# Patient Record
Sex: MALE | Race: OTHER | HISPANIC OR LATINO | Employment: PART TIME | URBAN - METROPOLITAN AREA
[De-identification: names, ages, dates, MRNs, and addresses within clinical notes are randomized per-mention and may not be internally consistent; named-entity substitution may affect disease eponyms.]

---

## 2017-01-04 ENCOUNTER — ALLSCRIPTS OFFICE VISIT (OUTPATIENT)
Dept: OTHER | Facility: OTHER | Age: 32
End: 2017-01-04

## 2017-10-18 ENCOUNTER — GENERIC CONVERSION - ENCOUNTER (OUTPATIENT)
Dept: OTHER | Facility: OTHER | Age: 32
End: 2017-10-18

## 2017-11-22 ENCOUNTER — GENERIC CONVERSION - ENCOUNTER (OUTPATIENT)
Dept: OTHER | Facility: OTHER | Age: 32
End: 2017-11-22

## 2018-01-09 NOTE — CONSULTS
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)    Plan  Abnormal CT of brain    · Follow-up visit in 6 months Evaluation and Treatment  Follow-up, after CT head, Rhoda Nix 12/27/16 @ 930  Status: Complete  Done: [id number]   Ordered; For: Abnormal CT of brain; Ordered By: Yumi Joshua Performed:  Due: 96YWX8146; Last Updated By: Kimberly Galicia; 6/30/2016 8:40:13 AM   · * CT HEAD WO CONTRAST; Status:Need Information - Financial Authorization; Requested for:Approx [id number];    Perform:[facility] Radiology; Last Updated By:Heather Cleveland; 6/30/2016 8:42:14 AM;Ordered; For:Abnormal CT of brain; Ordered By:Jourdan Duong;    Discussion/Summary    76-year-old male, presents for review of CT head  Studies reviewed in detail by Dr Washington Talamantes, and reviewed with the patient  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  No jhonny lucencies are identified in the left occiput  Clinically patient was and asymptomatic of these findings, he does continue some upper and lower back spasm which he reports is improved and under management  Further follow-up in 6 months is advised with imaging (CT head) prior to visit to assess clival status  These findings, impressions and recommendations are reviewed in great detail with the patient, he expressed understanding and agreement, his questions were answered completely and to his satisfaction  Follow up has been scheduled in 6 months, and imaging has been requested prior to visit  Chief Complaint    1  Headache  Initial consultation, history of abnormal cervical spine x-ray      History of Present Illness  76-year-old male, presents as noted above  Has history of upper back and lower back spasm, times approximately 2 months ago, patient reports he had been working out and throwing a football which precipitated the event   Had seen [facility] in the past for low back pain, as a consequence he self-referred himself for evaluation  As portion of the evaluation patient underwent plain films of the cervical spine  Incidental finding was made of a oval lucency in the left occiput  This was reviewed with his primary care provider (Dr Jonathan Odonnell) who felt further evaluation is indicated and requested CT of head  CT of head was completed and failed to demonstrate a lucency in the left occiput, however incidental finding was noted in the clivus of ("ratty" appearance per radiology)  Issue was reviewed at the [facility] who felt that further evaluation is 6 months of the 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 was indicated  Patient remains asymptomatic, reports upper and lower back muscle spasm is improving, continues with physical therapy  Patient otherwise reports no difficulty with headaches, visual disturbance, motor or sensory deficits in the upper and lower extremities, mentation difficulties or memory issues, difficulty with executive function  Additionally denies weight loss or weight gain, fatigue, gait or balance disturbance  MATT FITZGERALD presents with complaints of no headache  Review of Systems    Constitutional: No fever or chills, feels well, no tiredness, no recent weight gain or weight loss  Eyes: No complaints of eye pain, no red eyes, no discharge from eyes, no itchy eyes  ENT: no complaints of earache, no hearing loss, no nosebleeds, no nasal discharge, no sore throat, no hoarseness  Cardiovascular: No complaints of slow heart rate, no fast heart rate, no chest pain, no palpitations, no leg claudication, no lower extremity  Respiratory: No complaints of shortness of breath, no wheezing, no cough, no SOB on exertion, no orthopnea or PND  Gastrointestinal: No complaints of abdominal pain, no constipation, no nausea or vomiting, no diarrhea or bloody stools     Genitourinary: No complaints of dysuria, no incontinence, no hesitancy, no nocturia, no genital lesion, no testicular pain  Musculoskeletal: No complaints of arthralgia, no myalgias, no joint swelling or stiffness, no limb pain or swelling  Integumentary: No complaints of skin rash or skin lesions, no itching, no skin wound, no dry skin  Neurological: No compliants of headache, no confusion, no convulsions, no numbness or tingling, no dizziness or fainting, no limb weakness, no difficulty walking  Psychiatric: Is not suicidal, no sleep disturbances, no anxiety or depression, no change in personality, no emotional problems  Endocrine: No complaints of proptosis, no hot flashes, no muscle weakness, no erectile dysfunction, no deepening of the voice, no feelings of weakness  Hematologic/Lymphatic: No complaints of swollen glands, no swollen glands in the neck, does not bleed easily, no easy bruising  ROS reviewed  Active Problems    1  Abnormal x-ray of neck (793 99) (R93 8)   2  Asthma (493 90) (J45 909)   3  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)    Past Medical History    The active problems and past medical history were reviewed and updated today  Surgical History    The surgical history was reviewed and updated today  Family History  Father    1  Family history of Anxiety   2  Family history of hypertension (V17 49) (Z82 49)    The family history was reviewed and updated today  Social History    · Exercising regularly (more than 90 min/week)   · Full-time employment   ·    · Never a smoker   · Occasional alcohol use  The social history was reviewed and updated today  Current Meds   1  Montelukast Sodium 10 MG Oral Tablet; TAKE 1 TABLET DAILY; Therapy: 33HHB0992 to (Evaluate:11Mar2016)  Requested for: 29NXN8248; Last   Rx:96Znj4472 Ordered   2  Ventolin  (90 Base) MCG/ACT Inhalation Aerosol Solution; INHALE 1 TO 2 PUFFS   EVERY 4 TO 6 HOURS AS NEEDED;    Therapy: 43PSM9495 to (Evaluate:01Jun2016)  Requested for: 87LPM7923; Last   TP:30PWL5884 Ordered    The medication list was reviewed and updated today  Allergies    1  No Known Drug Allergies    Vitals  Vital Signs [Data Includes: Current Encounter]    Recorded: 99CQO6990 07:52AM   Temperature 98 4 F   Heart Rate 75   Respiration 16   Systolic 833   Diastolic 76   Height 5 ft 10 5 in   Weight 212 lb    BMI Calculated 29 99   BSA Calculated 2 15     Physical Exam     Constitutional Patient appears healthy and well developed  No signs of acute distress present  well developed, appears healthy, comfortable, overweight and appearance reflects stated age  Head and Face Normal on inspection  Neck No JVD  Respiratory Respiratory effort: Normal   Auscultation of lungs: Clear to auscultation bilaterally  Cardiovascular Auscultation of heart: Rate is regular  Rhythm is regular  No heart murmur appreciated  Musculo: Spine Contour is normal  No tenderness of the spine billaterally  Skin warm and dry  Neurologic - Mental Status: Alert and Oriented x3  Mood and affect: Affect is normal   Grossly nonfocal     Cranial Nerve Exam:  2nd cranial nerve: Visual field was full to confrontation  3rd, 4th, and 6th cranial nerves: Normal with no deficit  5th cranial nerve: Sensation was intact in all three divisions to light touch and temperature  Motor function was intact  7th cranial nerve: Face symmetrical at grimace and at rest  8th cranial nerve: Grossly intact to finger rub bilaterally  9th and 10th cranial nerves: Uvula is midline  11th cranial nerve: Shoulder shrug equal bilaterally  12th cranial nerve: Tongue mideline, no atrophy present  Motor System General Motor Strength: No pronator drift and no parietal drift  Motor System - Upper Extremities: Normal to inspection and palpation  Strength: Deltoids 5/5 bilaterally  Biceps 5/5 bilaterally  Triceps 5/5 bilaterally  Extensor carpi radials is 5/5 bilaterally  Extensor digitorum 5/5 bilaterally  Intrinsic 5/5 bilaterally   5/5 bilaterally     Motor System - Lower Extremities: Normal to inspection and palpation  Strength: iliopsoas 5/5 bilaterally  Quadriceps 5/5 bilaterally  Hamstrings 5/5 bilaterally  Gastrocnemius 5/5 bilaterally  Reflexes: Biceps reflexes are 2+ bilaterally  Triceps reflexes are 2+ bilaterally  Achilles reflexes are 2+ bilaterally  Babinski's reflex is 2+ down going bilaterally  Ankle clonus is absent bilaterally  Coordination: Finger to nose was normal    Sensory: Sensation grossly intact to light touch  Sensation grossly intact to light touch  Gait and Station: Burt with a normal gait  Results/Data  Results   * CT HEAD WO CONTRAST [id number] 06:02PM Esther Ferrara Order Number: [id number]   Performing Comments: please include cranial cervical junction to c2 -   lucency left occiput incidental finding on recent xray at [facility]   - Patient Instructions: To schedule thisl appointment, please contact Central Scheduling at ([phone]  Test Name Result Flag Reference   CT HEAD WO CONTRAST (Report)     CT BRAIN - WITHOUT CONTRAST     INDICATION: Abnormal findings on diagnostic imaging  Abnormal findings on diagnostic imaging of other specified bony structures  Lucency left occiput incidental finding on recent outside x-ray  The outside examination is not available for comparison  COMPARISON: None available  If and when the outside examination arrive, comparison will be made and an addendum will be dictated at that time  TECHNIQUE: CT examination of the brain was performed  In addition to axial images, coronal reformatted images were created and submitted for interpretation  Examination was performed utilizing techniques to minimize radiation dose, including the use    of dose reduction software  IMAGE QUALITY: Diagnostic  FINDINGS:      PARENCHYMA: No intracranial mass, mass effect or midline shift  No acute intracranial hemorrhage  No CT signs of acute infarction          VENTRICLES AND EXTRA-AXIAL SPACES: Normal for patient's age  VISUALIZED ORBITS AND PARANASAL SINUSES: Orbits appear normal  Mild scattered sinus mucosal thickening is noted  No fluid levels are seen  CALVARIUM AND EXTRACRANIAL SOFT TISSUES:    There is no evidence of a lucency in the calvarium within the occiput  There is a moth-eaten appearance of the clivus, likely representing fibrous dysplasia  IMPRESSION:   1  No acute intracranial abnormality  2  No evidence of calvarial lucency in the occiput  If and when the outside examination arrive, comparison will be made and an addendum will be dictated at that time  3  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

## 2018-01-12 NOTE — PROGRESS NOTES
Assessment    1  Encounter for preventive health examination (V70 0) (Z00 00)   2  Flu vaccine need (V04 81) (Z23)    Plan  Asthma    · Symbicort 160-4 5 MCG/ACT Inhalation Aerosol; INHALE 2 PUFFS TWICE DAILY  RINSE MOUTH AFTER USE   · Ventolin  (90 Base) MCG/ACT Inhalation Aerosol Solution; INHALE 1 TO  2 PUFFS EVERY 4 TO 6 HOURS AS NEEDED   · Complete PFT; Status:Hold For - Scheduling; Requested for:20Oct2016;   Flu vaccine need    · Fluzone Quadrivalent Intramuscular Suspension    Discussion/Summary  Impression: health maintenance visit  Currently, he eats a healthy diet and has an adequate exercise regimen  Testicular cancer screening: monthly self testicular exam was advised  Advice and education were given regarding nutrition, aerobic exercise and seat belt use  He did see neurosurgeon since last here, he will be following up with imaging in 6 months  We discussed overuse Ventolin inhaler, he relates increased symptoms with feeling ill along with exercise  I would like him to start Symbicort inhaler 2 puffs twice daily rinsing mouth after use, (watch cost issue -call if cannot afford) he can have Ventolin to use as needed/preexercise  Continue with Singulair/montelukast daily as is  Pulmonary function testing advise, he relates this may not be covered and will consider it  Did do a flu shot today  If symptoms uncontrolled in future I would like him to evaluate with allergist     Continue with routine exercise, watch healthy diet  Do lipids age 28-36 - no indication other BW  Chief Complaint  Physical      History of Present Illness  HM, Adult Male: The patient is being seen for a health maintenance evaluation  General Health:   Screening:   HPI: in for PE - Had been overusing Ventolin over the summer - used about one a month - on Singulair   Past "upset stomach w Flovent"  Worse w exercise   Did therapy for back x 3 mo earlier in year - doing home therapy - see prev re [facility]- saw NS and "neck is ok" Will redo imaging 6 months    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[place]    Constitutional: as noted in HPI, no fever, not feeling poorly, no recent weight gain, not feeling tired and no recent weight loss  Eyes: no eyesight problems  ENT: no earache and no sore throat  Cardiovascular: No complaints of slow heart rate, no fast heart rate, no chest pain, no palpitations, no leg claudication, no lower extremity  Respiratory: as noted in HPI  Gastrointestinal: No complaints of abdominal pain, no constipation, no nausea or vomiting, no diarrhea or bloody stools  Genitourinary: no dysuria  Integumentary: no skin lesions  Neurological: no headache, no confusion, no dizziness, no limb weakness, no convulsions and no fainting  Psychiatric: no anxiety and no depression  Hematologic/Lymphatic: No complaints of swollen glands, no swollen glands in the neck, does not bleed easily, no easy bruising  Active Problems    1  Asthma (493 90) (J45 909)   2  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)   3  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)    Past Medical History    · Asthma (493 90) (J45 909)    Family History  Father    · Family history of Anxiety   · Family history of hypertension (V17 49) (Z82 49)  Grandfather    · Family history of cardiac disorder (V17 49) (Z82 49)    Social History    · Exercising regularly (more than 90 min/week)   · Full-time employment   ·    [employer]   ·    · Never a smoker   · Occasional alcohol use    Current Meds   1  Montelukast Sodium 10 MG Oral Tablet; TAKE 1 TABLET DAILY; Therapy: 84SZA6035 to (Evaluate:27Nov2016)  Requested for: 77GBB7672; Last   Rx:28Sep2016 Ordered   2  Ventolin  (90 Base) MCG/ACT Inhalation Aerosol Solution; INHALE 1 TO 2   PUFFS EVERY 4 TO 6 HOURS AS NEEDED;    Therapy: 32FEO3713 to (Evaluate:28Oct2016)  Requested for: 98KHC6271; Last   Rx:28Sep2016 Ordered    Allergies    1  No Known Drug Allergies    Vitals   Recorded: 76PLQ0200 39:48PE   Systolic 832   Diastolic 76   Heart Rate 68   Height 5 ft 10 5 in   Weight 210 lb 8 0 oz   BMI Calculated 29 78   BSA Calculated 2 15     Physical Exam    Constitutional   General appearance: No acute distress, well appearing and well nourished  Head and Face   Head and face: Normal     Palpation of the face and sinuses: No sinus tenderness  Ears, Nose, Mouth, and Throat   Otoscopic examination: Tympanic membranes translucent with normal light reflex  Canals patent without erythema  Oropharynx: Normal with no erythema, edema, exudate or lesions  Neck   Neck: Supple, symmetric, trachea midline, no masses  Thyroid: Normal, no thyromegaly  Pulmonary   Auscultation of lungs: Clear to auscultation  Cardiovascular   Auscultation of heart: Normal rate and rhythm, normal S1 and S2, no murmurs  Carotid pulses: 2+ bilaterally  Abdomen   Abdomen: Non-tender, no masses  Lymphatic   Palpation of lymph nodes in neck: No lymphadenopathy  Neurologic   Cranial nerves: Cranial nerves 2-12 intact  Psychiatric   Judgment and insight: Normal     Orientation to person, place and time: Normal     Recent and remote memory: Intact  Mood and affect: Normal        Results/Data  PHQ-2 Adult Depression Screening 20Oct2016 08:33AM User, Ahs     Test Name Result Flag Reference   PHQ-2 Adult Depression Score 0     Over the last two weeks, how often have you been bothered by any of the following problems?   Little interest or pleasure in doing things: Not at all - 0  Feeling down, depressed, or hopeless: Not at all - 0   PHQ-2 Adult Depression Screening Negative         Future Appointments    Date/Time Provider Specialty Site   12/27/2016 09:30 AM Antonio Duong, [facility]   Electronically signed by : Tyson Leblanc DO; Oct 20 2016  9:03AM EST (Author)

## 2018-01-12 NOTE — MISCELLANEOUS
10/18/2017        Dear Chito Carlos ; We have tried to reach you several times without success please call our office for an appointment                 [facility]

## 2018-01-13 VITALS
HEART RATE: 84 BPM | HEIGHT: 71 IN | BODY MASS INDEX: 29.82 KG/M2 | RESPIRATION RATE: 16 BRPM | DIASTOLIC BLOOD PRESSURE: 71 MMHG | TEMPERATURE: 97.8 F | SYSTOLIC BLOOD PRESSURE: 120 MMHG | WEIGHT: 213 LBS

## 2018-01-17 NOTE — RESULT NOTES
Verified Results  * CT HEAD WO CONTRAST 09Jun2016 06:02PM Agus Solorzano Order Number: [id number]   Performing Comments: please include cranial cervical junction to c2 -   lucency left occiput incidental finding on recent xray at [address] [place] [address]   - Patient Instructions: To schedule thisl appointment, please contact Central Scheduling at ([phone]  Test Name Result Flag Reference   CT HEAD WO CONTRAST (Report)     CT BRAIN - WITHOUT CONTRAST     INDICATION: Abnormal findings on diagnostic imaging  Abnormal findings on diagnostic imaging of other specified bony structures  Lucency left occiput incidental finding on recent outside x-ray  The outside examination is not available for comparison  COMPARISON: None available  If and when the outside examination arrive, comparison will be made and an addendum will be dictated at that time  TECHNIQUE: CT examination of the brain was performed  In addition to axial images, coronal reformatted images were created and submitted for interpretation  Examination was performed utilizing techniques to minimize radiation dose, including the use    of dose reduction software  IMAGE QUALITY: Diagnostic  FINDINGS:      PARENCHYMA: No intracranial mass, mass effect or midline shift  No acute intracranial hemorrhage  No CT signs of acute infarction  VENTRICLES AND EXTRA-AXIAL SPACES: Normal for patient's age  VISUALIZED ORBITS AND PARANASAL SINUSES: Orbits appear normal  Mild scattered sinus mucosal thickening is noted  No fluid levels are seen  CALVARIUM AND EXTRACRANIAL SOFT TISSUES:    There is no evidence of a lucency in the calvarium within the occiput  There is a moth-eaten appearance of the clivus, likely representing fibrous dysplasia  IMPRESSION:   1  No acute intracranial abnormality  2  No evidence of calvarial lucency in the occiput   If and when the outside examination arrive, comparison will be made and an addendum will be dictated at that time  3  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

## 2018-11-29 DIAGNOSIS — J45.909 ASTHMA, UNSPECIFIED ASTHMA SEVERITY, UNSPECIFIED WHETHER COMPLICATED, UNSPECIFIED WHETHER PERSISTENT: Primary | ICD-10-CM

## 2018-11-29 RX ORDER — BUDESONIDE AND FORMOTEROL FUMARATE DIHYDRATE 160; 4.5 UG/1; UG/1
2 AEROSOL RESPIRATORY (INHALATION) 2 TIMES DAILY
Qty: 1 INHALER | Refills: 0 | OUTPATIENT
Start: 2018-11-29

## 2018-11-29 RX ORDER — BUDESONIDE AND FORMOTEROL FUMARATE DIHYDRATE 160; 4.5 UG/1; UG/1
2 AEROSOL RESPIRATORY (INHALATION) 2 TIMES DAILY
COMMUNITY
Start: 2016-10-20 | End: 2021-02-22 | Stop reason: SDUPTHER

## 2018-11-29 RX ORDER — ALBUTEROL SULFATE 90 UG/1
AEROSOL, METERED RESPIRATORY (INHALATION)
Qty: 1 INHALER | Refills: 0 | OUTPATIENT
Start: 2018-11-29

## 2018-11-29 RX ORDER — MONTELUKAST SODIUM 10 MG/1
1 TABLET ORAL DAILY
COMMUNITY
Start: 2015-08-14 | End: 2021-02-22 | Stop reason: ALTCHOICE

## 2018-11-29 RX ORDER — ALBUTEROL SULFATE 90 UG/1
1-2 AEROSOL, METERED RESPIRATORY (INHALATION) AS NEEDED
COMMUNITY
Start: 2015-08-14

## 2018-11-29 NOTE — TELEPHONE ENCOUNTER
I received request to refill medication, we have not seen him since January 2017, please clarify if he is still seeing our office, needs office visit with 1 of us to prescribe medications

## 2021-02-22 ENCOUNTER — TELEMEDICINE (OUTPATIENT)
Dept: FAMILY MEDICINE CLINIC | Facility: CLINIC | Age: 36
End: 2021-02-22
Payer: COMMERCIAL

## 2021-02-22 VITALS — HEIGHT: 71 IN | WEIGHT: 210 LBS | BODY MASS INDEX: 29.4 KG/M2

## 2021-02-22 DIAGNOSIS — J45.30 MILD PERSISTENT ASTHMA WITHOUT COMPLICATION: Primary | ICD-10-CM

## 2021-02-22 PROCEDURE — 99213 OFFICE O/P EST LOW 20 MIN: CPT | Performed by: FAMILY MEDICINE

## 2021-02-22 PROCEDURE — 3008F BODY MASS INDEX DOCD: CPT | Performed by: FAMILY MEDICINE

## 2021-02-22 RX ORDER — BUDESONIDE AND FORMOTEROL FUMARATE DIHYDRATE 160; 4.5 UG/1; UG/1
AEROSOL RESPIRATORY (INHALATION)
Qty: 1 INHALER | Refills: 6 | Status: SHIPPED | OUTPATIENT
Start: 2021-02-22

## 2021-02-22 NOTE — PATIENT INSTRUCTIONS
We reviewed his mild persistent asthma, he relates he had been receiving prescriptions from an urgent care center, no recent need for rescue inhaler, does keep Ventolin on hand  Has been using Symbicort 1 puff daily, can continue at that dose, increase to twice daily if asthma flares  He is off Singulair  Try to increase walking for exercise  He is planning on receiving COVID vaccine  We should see him in the office in 6 to 8 months with a full physical, consider blood work, CMP, TSH, lipids then  Consider future PFTs  also, he last saw neurosurgeon back in 2017, fibrous dysplasia anterior skull base, appears benign on previous testing  He has no headaches or neck pain

## 2021-02-22 NOTE — PROGRESS NOTES
Virtual Regular Visit      Assessment/Plan:    Problem List Items Addressed This Visit        Respiratory    Mild persistent asthma without complication - Primary    Relevant Medications    budesonide-formoterol (Symbicort) 160-4 5 mcg/act inhaler        Patient Instructions     We reviewed his mild persistent asthma, he relates he had been receiving prescriptions from an urgent care center, no recent need for rescue inhaler, does keep Ventolin on hand  Has been using Symbicort 1 puff daily, can continue at that dose, increase to twice daily if asthma flares  He is off Singulair  Try to increase walking for exercise  He is planning on receiving COVID vaccine  We should see him in the office in 6 to 8 months with a full physical, consider blood work, CMP, TSH, lipids then  Consider future PFTs  also, he last saw neurosurgeon back in 2017, fibrous dysplasia anterior skull base, appears benign on previous testing  He has no headaches or neck pain  Reason for visit is   Chief Complaint   Patient presents with    Virtual Regular Visit        Encounter provider Malcolm Sifuentes DO    Provider located at [address] [place] [address] [facility] [id number]      Recent Visits  No visits were found meeting these conditions  Showing recent visits within past 7 days and meeting all other requirements     Today's Visits  Date Type Provider Dept   02/22/21 Telemedicine Malcolm Sifuentes DO [facility]   Showing today's visits and meeting all other requirements     Future Appointments  No visits were found meeting these conditions  Showing future appointments within next 150 days and meeting all other requirements        The patient was identified by name and date of birth   Janey Richards Colon was informed that this is a telemedicine visit and that the visit is being conducted through [facility] and patient was informed that this is not a secure, HIPAA-compliant platform  He agrees to proceed     My office door was closed  No one else was in the room  He acknowledged consent and understanding of privacy and security of the video platform  The patient has agreed to participate and understands they can discontinue the visit at any time  Patient is aware this is a billable service  Subjective  Marylu Rivers is a 38 y o  male   Who I had last seen back in 2016, he relates he has been feeling well, last doctor visit was within urgent care center, had seen them after attending the [facility] last January  Has not required rescue inhaler recently, marcello is on Symbicort 1 puff daily, feels this controls his symptoms, is off Singulair   Past Medical History:   Diagnosis Date    Asthma        History reviewed  No pertinent surgical history  Current Outpatient Medications   Medication Sig Dispense Refill    albuterol (VENTOLIN HFA) 90 mcg/act inhaler Inhale 1-2 puffs as needed      budesonide-formoterol (Symbicort) 160-4 5 mcg/act inhaler Use up to 2 puffs twice a day as directed 1 Inhaler 6     No current facility-administered medications for this visit  Not on File    Review of Systems   Constitutional: Negative for appetite change, fatigue, fever and unexpected weight change  HENT: Negative for sore throat and trouble swallowing  Eyes: Negative for visual disturbance  Respiratory: Negative for cough, chest tightness and shortness of breath  Cardiovascular: Negative for chest pain, palpitations and leg swelling  Gastrointestinal: Negative for abdominal pain, blood in stool, nausea and vomiting  No acid reflux     No change in bowel   Genitourinary: Negative for dysuria and hematuria  Musculoskeletal: Negative for neck pain and neck stiffness  Neurological: Negative for dizziness, syncope, light-headedness and headaches  Psychiatric/Behavioral: Negative for behavioral problems and confusion         Video Exam    Vitals:    02/22/21 0930   Weight: 95 3 kg (210 lb)   Height: 5' 10 5" (1 791 m)   Body mass index is 29 71 kg/m²  BP Readings from Last 3 Encounters:   01/04/17 120/71   10/20/16 128/76   06/30/16 118/76     Wt Readings from Last 3 Encounters:   02/22/21 95 3 kg (210 lb)   01/04/17 96 6 kg (213 lb)   10/20/16 95 5 kg (210 lb 8 oz)           Physical Exam  Constitutional:       Comments: He looks well, no respiratory distress, no cough  I spent 10 minutes with patient today in which greater than 50% of the time was spent in counseling/coordination of care regarding above      [address] acknowledges that he has consented to an online visit or consultation  He understands that the online visit is based solely on information provided by him, and that, in the absence of a face-to-face physical evaluation by the physician, the diagnosis he receives is both limited and provisional in terms of accuracy and completeness  This is not intended to replace a full medical face-to-face evaluation by the physician  Sherie Alva Colon understands and accepts these terms

## 2021-03-10 DIAGNOSIS — Z23 ENCOUNTER FOR IMMUNIZATION: ICD-10-CM

## 2021-03-15 ENCOUNTER — IMMUNIZATIONS (OUTPATIENT)
Dept: FAMILY MEDICINE CLINIC | Facility: HOSPITAL | Age: 36
End: 2021-03-15

## 2021-03-15 DIAGNOSIS — Z23 ENCOUNTER FOR IMMUNIZATION: Primary | ICD-10-CM

## 2021-03-15 PROCEDURE — 0001A SARS-COV-2 / COVID-19 MRNA VACCINE (PFIZER-BIONTECH) 30 MCG: CPT

## 2021-03-15 PROCEDURE — 91300 SARS-COV-2 / COVID-19 MRNA VACCINE (PFIZER-BIONTECH) 30 MCG: CPT

## 2021-03-25 ENCOUNTER — TELEMEDICINE (OUTPATIENT)
Dept: FAMILY MEDICINE CLINIC | Facility: CLINIC | Age: 36
End: 2021-03-25
Payer: COMMERCIAL

## 2021-03-25 DIAGNOSIS — J06.9 VIRAL UPPER RESPIRATORY TRACT INFECTION: Primary | ICD-10-CM

## 2021-03-25 PROCEDURE — 99214 OFFICE O/P EST MOD 30 MIN: CPT | Performed by: FAMILY MEDICINE

## 2021-03-25 PROCEDURE — 3725F SCREEN DEPRESSION PERFORMED: CPT | Performed by: FAMILY MEDICINE

## 2021-03-25 PROCEDURE — 1036F TOBACCO NON-USER: CPT | Performed by: FAMILY MEDICINE

## 2021-03-25 RX ORDER — FLUTICASONE PROPIONATE 50 MCG
SPRAY, SUSPENSION (ML) NASAL
Qty: 1 BOTTLE | Refills: 0 | Status: SHIPPED | OUTPATIENT
Start: 2021-03-25 | End: 2021-04-15

## 2021-03-25 NOTE — PROGRESS NOTES
Virtual Regular Visit      Assessment/Plan:  1  Viral upper respiratory tract infection  Suspect viral  However , could be underlying allergy component  Will add flonase  Pt can continue with afrin type nasal spray for 1 more day (total of 3 days)  otc cold med prn  Recommendation to increase fluids - particularly water, rest, saline nasal spray and humidified air  Call if symptoms not improving/worsening into early next week - and to consider antibiotic  Pt can also proceed to carenow at any point if symptoms worsening  Certainly - low threshold to check - covid testing if worsening/not improving  Had covid shot # 1 - X 1 week ago  Problem List Items Addressed This Visit     None               Reason for visit is   Chief Complaint   Patient presents with    Virtual Regular Visit        Encounter provider Dali Aponte DO    Provider located at [address] [address] [phone]      Recent Visits  No visits were found meeting these conditions  Showing recent visits within past 7 days and meeting all other requirements     Future Appointments  No visits were found meeting these conditions  Showing future appointments within next 150 days and meeting all other requirements        The patient was identified by name and date of birth  Cydne Severe was informed that this is a telemedicine visit and that the visit is being conducted through [facility] and patient was informed that this is a secure, HIPAA-compliant platform  He agrees to proceed     My office door was closed  No one else was in the room  He acknowledged consent and understanding of privacy and security of the video platform  The patient has agreed to participate and understands they can discontinue the visit at any time  Patient is aware this is a billable service  Subjective  Cydne Severe is a 38 y o  male          HPI   Pt presents today with cold symptoms  He had covid vaccine # 1 last week  Sinus congestion, chills, headache - sinus distribution  Feels tired  Symptoms began X 3 days ago (a week after getting covid shot #1)    No known sick contacts or known exposure to covid  Pt is working at home  Pt taking nasal spray (afrin generic) - and cold and sinus meds - both otc    Pt has asthma  Is a nonsmoker        Past Medical History:   Diagnosis Date    Asthma        History reviewed  No pertinent surgical history  Current Outpatient Medications   Medication Sig Dispense Refill    albuterol (VENTOLIN HFA) 90 mcg/act inhaler Inhale 1-2 puffs as needed      budesonide-formoterol (Symbicort) 160-4 5 mcg/act inhaler Use up to 2 puffs twice a day as directed 1 Inhaler 6     No current facility-administered medications for this visit  Not on File    Review of Systems   Constitutional: Positive for chills  Negative for fever  HENT: Positive for congestion and sinus pain  Negative for sore throat  No loss of taste or smell  Started with sore throat X 3 days ago - and is now gone  Clear nasal discharge  Respiratory: Negative for cough, shortness of breath and wheezing  Pt had some wheezing yesterday but not today - using symbicort qd  Used ventolin X 1 yesterday   Cardiovascular: Negative for chest pain and palpitations  Gastrointestinal: Negative for diarrhea, nausea and vomiting  Allergic/Immunologic:        Occasional seasonal allergies   Neurological: Positive for headaches  Video Exam    There were no vitals filed for this visit  Temp - 98 3    Physical Exam  Constitutional:       General: He is not in acute distress  Appearance: Normal appearance  He is not ill-appearing or toxic-appearing  Eyes:      General: No scleral icterus  Pulmonary:      Effort: Pulmonary effort is normal  No respiratory distress        Comments: Conversing normally without shortness of breath or coughing      Neurological:      General: No focal deficit present  Mental Status: He is alert and oriented to person, place, and time  Psychiatric:         Mood and Affect: Mood normal          Behavior: Behavior normal          Thought Content: Thought content normal          Judgment: Judgment normal                 VIRTUAL VISIT DISCLAIMER    Rojas Helm acknowledges that he has consented to an online visit or consultation  He understands that the online visit is based solely on information provided by him, and that, in the absence of a face-to-face physical evaluation by the physician, the diagnosis he receives is both limited and provisional in terms of accuracy and completeness  This is not intended to replace a full medical face-to-face evaluation by the physician  Rojas Helm understands and accepts these terms

## 2021-04-05 ENCOUNTER — IMMUNIZATIONS (OUTPATIENT)
Dept: FAMILY MEDICINE CLINIC | Facility: HOSPITAL | Age: 36
End: 2021-04-05

## 2021-04-05 DIAGNOSIS — Z23 ENCOUNTER FOR IMMUNIZATION: Primary | ICD-10-CM

## 2021-04-05 PROCEDURE — 0002A SARS-COV-2 / COVID-19 MRNA VACCINE (PFIZER-BIONTECH) 30 MCG: CPT

## 2021-04-05 PROCEDURE — 91300 SARS-COV-2 / COVID-19 MRNA VACCINE (PFIZER-BIONTECH) 30 MCG: CPT

## 2021-04-15 DIAGNOSIS — J06.9 VIRAL UPPER RESPIRATORY TRACT INFECTION: ICD-10-CM

## 2021-04-15 RX ORDER — FLUTICASONE PROPIONATE 50 MCG
SPRAY, SUSPENSION (ML) NASAL
Qty: 16 ML | Refills: 6 | Status: SHIPPED | OUTPATIENT
Start: 2021-04-15

## 2021-04-19 ENCOUNTER — OFFICE VISIT (OUTPATIENT)
Dept: FAMILY MEDICINE CLINIC | Facility: CLINIC | Age: 36
End: 2021-04-19
Payer: COMMERCIAL

## 2021-04-19 VITALS
BODY MASS INDEX: 28.7 KG/M2 | TEMPERATURE: 97.7 F | WEIGHT: 205 LBS | HEART RATE: 86 BPM | DIASTOLIC BLOOD PRESSURE: 90 MMHG | OXYGEN SATURATION: 98 % | HEIGHT: 71 IN | SYSTOLIC BLOOD PRESSURE: 120 MMHG

## 2021-04-19 DIAGNOSIS — D17.1 LIPOMA OF LOWER BACK: Primary | ICD-10-CM

## 2021-04-19 PROCEDURE — 3008F BODY MASS INDEX DOCD: CPT | Performed by: FAMILY MEDICINE

## 2021-04-19 PROCEDURE — 1036F TOBACCO NON-USER: CPT | Performed by: FAMILY MEDICINE

## 2021-04-19 PROCEDURE — 99213 OFFICE O/P EST LOW 20 MIN: CPT | Performed by: FAMILY MEDICINE

## 2021-04-19 NOTE — PROGRESS NOTES
BMI Counseling: Body mass index is 28 59 kg/m²  The BMI is above normal  Nutrition recommendations include encouraging healthy choices of fruits and vegetables and moderation in carbohydrate intake  Exercise recommendations include exercising 3-5 times per week  FAMILY PRACTICE OFFICE VISIT  [facility]  [address]  [address] [place], [place], [address]      NAME: Vika Helm  AGE: 38 y o  SEX: male  : 1985   MRN: [id number]    DATE: 2021  TIME: 11:04 AM    Assessment and Plan     Problem List Items Addressed This Visit     None      Visit Diagnoses     Lipoma of lower back ( Left SI area )    -  Primary    BMI 28 0-28 9,adult              Patient Instructions   We discussed benign lipoma left sacroiliac area, nothing more needs done  Exercise routinely, stretch regarding low back pain, does see chiropractor at times  He is status post clinical COVID infection ( wife tested positive ), developed 1 week after his 1st COVID vaccine, he did receive 2nd vaccine recently  Does have history asthma, uses rescue inhaler once a week or so, currently on Symbicort 1 puff daily, increase to 2 puffs twice daily if any worsening  Last soft neurosurgeon back in 2017, we will see him in 6 months with a physical, plan CMP, lipids, TSH at that time  Call us sooner if needed      Chief Complaint     Chief Complaint   Patient presents with    Back Pain     lump on lower back pain for 1 year, pain only when pressing        History of Present Illness   Navi Balderrama is a 38y o -year-old male who is in today with concern regarding lump left low back, occasionally sore low back, does see chiropractor at times  In March he did develop sore throat with some altered taste, smell, 1 week after COVID vaccine, wife subsequently tested positive for COVID, most likely had COVID infection which has resolved    He did receive his 2nd COVID vaccine  Uses rescue inhaler roughly once weekly  Is using Symbicort, overall feels well      Review of Systems   Review of Systems   Constitutional: Negative for appetite change, fatigue, fever and unexpected weight change  HENT: Negative for sore throat and trouble swallowing  Respiratory: Negative for cough, chest tightness and shortness of breath  Cardiovascular: Negative for chest pain, palpitations and leg swelling  Gastrointestinal: Negative for abdominal pain, blood in stool, nausea and vomiting  No acid reflux     No change in bowel   Genitourinary: Negative for dysuria and hematuria  Neurological: Negative for dizziness, syncope, light-headedness and headaches  Psychiatric/Behavioral: Negative for behavioral problems and confusion  Active Problem List     Patient Active Problem List   Diagnosis    Mild persistent asthma without complication    Fibrous dysplasia of bone    BMI 29 0-29 9,adult       Past Medical History:  Reviewed    Past Surgical History:  Reviewed    Family History:  Reviewed    Social History:  Reviewed    Objective     Vitals:    04/19/21 1039   BP: 120/90   BP Location: Left arm   Patient Position: Sitting   Cuff Size: Standard   Pulse: 86   Temp: 97 7 °F (36 5 °C)   SpO2: 98%   Weight: 93 kg (205 lb)   Height: 5' 11" (1 803 m)     Body mass index is 28 59 kg/m²  BP Readings from Last 3 Encounters:   04/19/21 120/90   01/04/17 120/71   10/20/16 128/76       Wt Readings from Last 3 Encounters:   04/19/21 93 kg (205 lb)   02/22/21 95 3 kg (210 lb)   01/04/17 96 6 kg (213 lb)       Physical Exam  Constitutional:       General: He is not in acute distress  Appearance: Normal appearance  He is well-developed  He is not ill-appearing  Eyes:      General: No scleral icterus  Cardiovascular:      Rate and Rhythm: Normal rate and regular rhythm  Heart sounds: Normal heart sounds  No murmur     Pulmonary:      Effort: Pulmonary effort is normal  No respiratory distress  Breath sounds: Normal breath sounds  Musculoskeletal:      Comments: Lipomatous mass left sacroiliac area, good full range of motion lumbar spine, no vertebral tenderness, no weakness in legs   Neurological:      General: No focal deficit present  Mental Status: He is alert and oriented to person, place, and time  Psychiatric:         Mood and Affect: Mood normal          Behavior: Behavior normal          ALLERGIES:  Not on File    Current Medications     Current Outpatient Medications   Medication Sig Dispense Refill    albuterol (VENTOLIN HFA) 90 mcg/act inhaler Inhale 1-2 puffs as needed      budesonide-formoterol (Symbicort) 160-4 5 mcg/act inhaler Use up to 2 puffs twice a day as directed 1 Inhaler 6    fluticasone (FLONASE) 50 mcg/act nasal spray USE 1 SPRAY INTO EACH NOSTRIL TWICE A DAY AS NEEDED 16 mL 6     No current facility-administered medications for this visit  No orders of the defined types were placed in this encounter          Edyta Mcneal DO

## 2021-04-19 NOTE — PATIENT INSTRUCTIONS
We discussed benign lipoma left sacroiliac area, nothing more needs done  Exercise routinely, stretch regarding low back pain, does see chiropractor at times  He is status post clinical COVID infection ( wife tested positive ), developed 1 week after his 1st COVID vaccine, he did receive 2nd vaccine recently  Does have history asthma, uses rescue inhaler once a week or so, currently on Symbicort 1 puff daily, increase to 2 puffs twice daily if any worsening  Last soft neurosurgeon back in 2017, we will see him in 6 months with a physical, plan CMP, lipids, TSH at that time    Call us sooner if needed

## 2021-10-21 ENCOUNTER — OFFICE VISIT (OUTPATIENT)
Dept: FAMILY MEDICINE CLINIC | Facility: CLINIC | Age: 36
End: 2021-10-21
Payer: COMMERCIAL

## 2021-10-21 VITALS
TEMPERATURE: 97.6 F | HEART RATE: 89 BPM | WEIGHT: 200 LBS | SYSTOLIC BLOOD PRESSURE: 100 MMHG | OXYGEN SATURATION: 97 % | DIASTOLIC BLOOD PRESSURE: 70 MMHG | BODY MASS INDEX: 28 KG/M2 | HEIGHT: 71 IN

## 2021-10-21 DIAGNOSIS — Z00.00 ENCOUNTER FOR ANNUAL PHYSICAL EXAM: Primary | ICD-10-CM

## 2021-10-21 DIAGNOSIS — G89.29 CHRONIC PAIN OF RIGHT KNEE: ICD-10-CM

## 2021-10-21 DIAGNOSIS — M25.561 CHRONIC PAIN OF RIGHT KNEE: ICD-10-CM

## 2021-10-21 DIAGNOSIS — J45.30 MILD PERSISTENT ASTHMA WITHOUT COMPLICATION: ICD-10-CM

## 2021-10-21 DIAGNOSIS — K21.9 GASTROESOPHAGEAL REFLUX DISEASE WITHOUT ESOPHAGITIS: ICD-10-CM

## 2021-10-21 PROCEDURE — 3008F BODY MASS INDEX DOCD: CPT | Performed by: FAMILY MEDICINE

## 2021-10-21 PROCEDURE — 3725F SCREEN DEPRESSION PERFORMED: CPT | Performed by: FAMILY MEDICINE

## 2021-10-21 PROCEDURE — 99395 PREV VISIT EST AGE 18-39: CPT | Performed by: FAMILY MEDICINE

## 2021-10-21 PROCEDURE — 1036F TOBACCO NON-USER: CPT | Performed by: FAMILY MEDICINE

## 2021-10-21 RX ORDER — FAMOTIDINE 10 MG
10 TABLET ORAL 2 TIMES DAILY PRN
COMMUNITY

## 2023-09-05 ENCOUNTER — OFFICE VISIT (OUTPATIENT)
Dept: FAMILY MEDICINE CLINIC | Facility: CLINIC | Age: 38
End: 2023-09-05
Payer: COMMERCIAL

## 2023-09-05 VITALS
OXYGEN SATURATION: 99 % | HEIGHT: 71 IN | WEIGHT: 195.8 LBS | SYSTOLIC BLOOD PRESSURE: 100 MMHG | HEART RATE: 77 BPM | DIASTOLIC BLOOD PRESSURE: 74 MMHG | BODY MASS INDEX: 27.41 KG/M2

## 2023-09-05 DIAGNOSIS — J45.30 MILD PERSISTENT ASTHMA WITHOUT COMPLICATION: ICD-10-CM

## 2023-09-05 DIAGNOSIS — Z00.00 ENCOUNTER FOR ANNUAL PHYSICAL EXAM: Primary | ICD-10-CM

## 2023-09-05 DIAGNOSIS — K21.9 GASTROESOPHAGEAL REFLUX DISEASE WITHOUT ESOPHAGITIS: ICD-10-CM

## 2023-09-05 PROCEDURE — 99395 PREV VISIT EST AGE 18-39: CPT | Performed by: FAMILY MEDICINE

## 2023-09-05 RX ORDER — BUDESONIDE AND FORMOTEROL FUMARATE DIHYDRATE 160; 4.5 UG/1; UG/1
2 AEROSOL RESPIRATORY (INHALATION) 2 TIMES DAILY
Qty: 30.6 G | Refills: 3 | Status: SHIPPED | OUTPATIENT
Start: 2023-09-05

## 2023-09-05 RX ORDER — PREDNISONE 20 MG/1
TABLET ORAL
COMMUNITY
Start: 2023-08-04 | End: 2023-08-16 | Stop reason: ALTCHOICE

## 2023-09-05 RX ORDER — ALBUTEROL SULFATE 90 UG/1
1-2 AEROSOL, METERED RESPIRATORY (INHALATION) EVERY 4 HOURS PRN
Qty: 18 G | Refills: 0 | Status: SHIPPED | OUTPATIENT
Start: 2023-09-05

## 2023-09-05 RX ORDER — MONTELUKAST SODIUM 10 MG/1
10 TABLET ORAL DAILY
Qty: 90 TABLET | Refills: 3 | Status: SHIPPED | OUTPATIENT
Start: 2023-09-05

## 2023-09-05 NOTE — PATIENT INSTRUCTIONS
Overall healthy 44year-old, did have 2 exacerbations asthma over the summer, required prednisone x2. We did discuss getting his asthma under better control, stay on Symbicort 2 puffs twice every day, rinse after use. He will add in Singulair 10 mg once every day. Can use albuterol as needed, has been requiring daily recently. Looks well here today, call us if anything worsens    We did review previous blood work, 2021 CBC, CMP were fine. We will plan to do those tests again along with fasting lipid at age 35, sooner if indicated    He is up to date with Diabetes screening. Immunization History   Administered Date(s) Administered    COVID-19 PFIZER VACCINE 0.3 ML IM 03/15/2021, 04/05/2021    Influenza Quadrivalent, 6-35 Months IM 10/20/2016    Tdap 09/03/2019       He does not do yearly Flu shot. He should do a flu shot every fall season in October. Tdap/tetanus shot is up to date  (done every 10 yrs for superficial cuts, every 5 yrs for deep wounds)  Covid vaccine received x2, consider booster if rates inpatient rise. Was never a smoker    Continue to try to watch healthy diet, exercise routinely. We will see him again in 12 months, sooner as needed.

## 2023-09-05 NOTE — PROGRESS NOTES
FAMILY PRACTICE OFFICE VISIT  Duy Patel D.O. [address] [facility]  [address] [place] [address].  [place], [place], [address]      NAME: Dotty Seip Colon  AGE: 44 y.o. SEX: male  : 1985   MRN: [id number]    DATE: 2023  TIME: 2:00 PM    Assessment and Plan     Problem List Items Addressed This Visit     Mild persistent asthma without complication    Relevant Medications    montelukast (SINGULAIR) 10 mg tablet    albuterol (Ventolin HFA) 90 mcg/act inhaler    budesonide-formoterol (Symbicort) 160-4.5 mcg/act inhaler    Gastroesophageal reflux disease without esophagitis   Other Visit Diagnoses     Encounter for annual physical exam    -  Primary          Patient Instructions     Overall healthy 19-year-old, did have 2 exacerbations asthma over the summer, required prednisone x2. We did discuss getting his asthma under better control, stay on Symbicort 2 puffs twice every day, rinse after use. He will add in Singulair 10 mg once every day. Can use albuterol as needed, has been requiring daily recently. Looks well here today, call us if anything worsens    We did review previous blood work,  CBC, CMP were fine. We will plan to do those tests again along with fasting lipid at age 35, sooner if indicated    He is up to date with Diabetes screening. Immunization History   Administered Date(s) Administered   • COVID-19 PFIZER VACCINE 0.3 ML IM 03/15/2021, 2021   • Influenza Quadrivalent, 6-35 Months IM 10/20/2016   • Tdap 2019       He does not do yearly Flu shot. He should do a flu shot every fall season in October. Tdap/tetanus shot is up to date  (done every 10 yrs for superficial cuts, every 5 yrs for deep wounds)  Covid vaccine received x2, consider booster if rates inpatient rise. Was never a smoker    Continue to try to watch healthy diet, exercise routinely. We will see him again in 12 months, sooner as needed.              Chief Complaint Chief Complaint   Patient presents with   • Physical Exam       History of Present Illness   [place] Apley is a 44y.o.-year-old male who is in for a regular check, I had last seen him in October 2021. He did require 2 courses of prednisone over the summer, [facility], feels outdoors smoke had triggered things. Otherwise has been feeling well    Review of Systems   Review of Systems   Constitutional: Negative for activity change (active, walks -  works [employer]), appetite change, fatigue, fever and unexpected weight change. HENT: Negative for sore throat and trouble swallowing. Respiratory: Positive for cough and wheezing. Negative for chest tightness and shortness of breath. Cardiovascular: Negative for chest pain, palpitations and leg swelling. Gastrointestinal: Negative for abdominal pain, blood in stool, nausea and vomiting. No recent acid reflux     No change in bowel   Genitourinary: Negative for dysuria and hematuria. Musculoskeletal: Negative for neck pain. Knee ok   Neurological: Negative for dizziness, syncope, light-headedness and headaches. Psychiatric/Behavioral: Negative for behavioral problems, confusion and sleep disturbance. Active Problem List     Patient Active Problem List   Diagnosis   • Mild persistent asthma without complication   • Fibrous dysplasia of bone   • Gastroesophageal reflux disease without esophagitis   • Chronic pain of right knee       Past Medical History:  Reviewed    Past Surgical History:  Reviewed    Family History:  Reviewed    Social History:  Reviewed    Objective     Vitals:    09/05/23 1318   BP: 100/74   BP Location: Left arm   Patient Position: Sitting   Cuff Size: Large   Pulse: 77   SpO2: 99%   Weight: 88.8 kg (195 lb 12.8 oz)   Height: 5' 11" (1.803 m)     Body mass index is 27.31 kg/m².     BP Readings from Last 3 Encounters:   09/05/23 100/74   10/21/21 100/70   04/19/21 120/90       Wt Readings from Last 3 Encounters:   09/05/23 88.8 kg (195 lb 12.8 oz)   10/21/21 90.7 kg (200 lb)   04/19/21 93 kg (205 lb)       Physical Exam  Constitutional:       General: He is not in acute distress. Appearance: Normal appearance. He is well-developed. He is not ill-appearing. Eyes:      General: No scleral icterus. Cardiovascular:      Rate and Rhythm: Normal rate and regular rhythm. Heart sounds: Normal heart sounds. No murmur heard. Pulmonary:      Effort: Pulmonary effort is normal. No respiratory distress. Breath sounds: Normal breath sounds. No wheezing, rhonchi or rales. Abdominal:      Palpations: Abdomen is soft. Tenderness: There is no abdominal tenderness. Musculoskeletal:      Right lower leg: No edema. Left lower leg: No edema. Lymphadenopathy:      Cervical: No cervical adenopathy. Skin:     Coloration: Skin is not jaundiced. Neurological:      General: No focal deficit present. Mental Status: He is alert and oriented to person, place, and time. Psychiatric:         Mood and Affect: Mood normal.         Behavior: Behavior normal.         ALLERGIES:  No Known Allergies    Current Medications     Current Outpatient Medications   Medication Sig Dispense Refill   • albuterol (Ventolin HFA) 90 mcg/act inhaler Inhale 1-2 puffs every 4 (four) hours as needed for wheezing 18 g 0   • budesonide-formoterol (Symbicort) 160-4.5 mcg/act inhaler Inhale 2 puffs 2 (two) times a day (rinse after use) 30.6 g 3   • montelukast (SINGULAIR) 10 mg tablet Take 1 tablet (10 mg total) by mouth daily 90 tablet 3     No current facility-administered medications for this visit. No orders of the defined types were placed in this encounter.         Flor Tijerina DO

## 2024-05-21 DIAGNOSIS — J45.30 MILD PERSISTENT ASTHMA WITHOUT COMPLICATION: ICD-10-CM

## 2024-05-21 RX ORDER — ALBUTEROL SULFATE 90 UG/1
1-2 AEROSOL, METERED RESPIRATORY (INHALATION) EVERY 4 HOURS PRN
Qty: 18 G | Refills: 5 | Status: SHIPPED | OUTPATIENT
Start: 2024-05-21

## 2024-05-21 RX ORDER — MONTELUKAST SODIUM 10 MG/1
10 TABLET ORAL DAILY
Qty: 90 TABLET | Refills: 1 | Status: SHIPPED | OUTPATIENT
Start: 2024-05-21

## 2024-05-21 NOTE — TELEPHONE ENCOUNTER
Reason for call:   [x] Refill   [] Prior Auth  [] Other:     Office:   [x] PCP/Provider -   [] Specialty/Provider -     ALBUTEROL  90 MCG    SINGULAIR  10 MG    [facility] - BELEM Kiran - [facility]  [address] [facility] [place], [place] [address]  Phone: [phone]  Fax: [phone]  SHELBIE #: --     Does the patient have enough for 3 days?   [] Yes   [x] No - Send as HP to POD

## 2024-06-17 ENCOUNTER — TELEPHONE (OUTPATIENT)
Dept: FAMILY MEDICINE CLINIC | Facility: CLINIC | Age: 39
End: 2024-06-17

## 2024-06-17 NOTE — TELEPHONE ENCOUNTER
JULISSAM for pt to call back to reschedule his September 9, 2024 appointment at 8:20 AM with Dr. Patel due to him being on vacation.

## 2025-01-27 ENCOUNTER — OFFICE VISIT (OUTPATIENT)
Dept: FAMILY MEDICINE CLINIC | Facility: CLINIC | Age: 40
End: 2025-01-27
Payer: COMMERCIAL

## 2025-01-27 VITALS
DIASTOLIC BLOOD PRESSURE: 80 MMHG | RESPIRATION RATE: 12 BRPM | WEIGHT: 216 LBS | OXYGEN SATURATION: 98 % | SYSTOLIC BLOOD PRESSURE: 130 MMHG | HEIGHT: 71 IN | BODY MASS INDEX: 30.24 KG/M2 | HEART RATE: 78 BPM

## 2025-01-27 DIAGNOSIS — Z00.00 ANNUAL PHYSICAL EXAM: Primary | ICD-10-CM

## 2025-01-27 DIAGNOSIS — M54.2 NECK PAIN: ICD-10-CM

## 2025-01-27 DIAGNOSIS — Z13.220 SCREENING, LIPID: ICD-10-CM

## 2025-01-27 DIAGNOSIS — J45.30 MILD PERSISTENT ASTHMA WITHOUT COMPLICATION: ICD-10-CM

## 2025-01-27 PROCEDURE — 99396 PREV VISIT EST AGE 40-64: CPT | Performed by: FAMILY MEDICINE

## 2025-01-27 NOTE — ASSESSMENT & PLAN NOTE
Symptoms do worsen to a degree in wintertime, has been using Symbicort maintenance inhaler 2 puffs daily, increase to twice daily if requiring rescue inhaler/albuterol or than twice weekly.  Does restart montelukast if symptoms worsen.    Overall feels asthma has been under control.    He did have an emergency room visit at [facility] in September for palpitations, no issues since, chest x-ray, EKG, CBC, CMP, troponin were okay then.  He cut back on caffeine.  Watch for palpitations associated with increased albuterol use.  If palpitations persist consider TSH    BMI 30, feels sleeps okay, occasional fatigue, watch for apnea

## 2025-01-27 NOTE — PROGRESS NOTES
Name: Jay Helm      : 1985      MRN: [id number]  Encounter Provider: Raul Patel DO  Encounter Date: 2025   Encounter department: [facility]  :  Assessment & Plan  Annual physical exam  Overall healthy 39-year-old.       Mild persistent asthma without complication  Symptoms do worsen to a degree in wintertime, has been using Symbicort maintenance inhaler 2 puffs daily, increase to twice daily if requiring rescue inhaler/albuterol or than twice weekly.  Does restart montelukast if symptoms worsen.    Overall feels asthma has been under control.    He did have an emergency room visit at [facility] in September for palpitations, no issues since, chest x-ray, EKG, CBC, CMP, troponin were okay then.  He cut back on caffeine.  Watch for palpitations associated with increased albuterol use.  If palpitations persist consider TSH    BMI 30, feels sleeps okay, occasional fatigue, watch for apnea       Screening, lipid  Await screening lipids.  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  Recently with some increased neck pain/stiffness he associates with vigorous swimming, started to swim for exercise, went once, overdid it.  No radicular complaints.  Focus on stretching, slowly increasing exercise, call if anything worsens.  Also does do yoga which is very beneficial.       Patient Instructions     We did review previous blood work, CBC/CMP at ER 2024 were fine  He is up to date with Diabetes screening.  Glucose was ok at 96    Immunization History   Administered Date(s) Administered    COVID-19 PFIZER VACCINE 0.3 ML IM 03/15/2021, 2021    Influenza Quadrivalent, 6-35 Months IM 10/20/2016    Tdap 2019       He does not do yearly Flu shot.  I would like him to start doing 1 every  season  Tdap/tetanus shot is up to date  (done every 10 yrs for  superficial cuts, every 5 yrs for deep wounds)  Covid vaccine previously received x 2      Was never a smoker     Regarding Colon Cancer screening, we discussed Colonoscopy vs ColoGuard is indicated starting at age 45.    Not indicated.    Discussed Prostate Cancer screening pros/cons starting at age 50.  PSA blood test not indicated.    Continue to try to watch healthy diet, exercise routinely.    We will see him again in 12 months, sooner as needed.                  History of Present Illness   He is in for yearly checkup, he turned 39, overall feeling well.      Review of Systems   Constitutional:  Negative for appetite change, fatigue, fever and unexpected weight change.   HENT:  Negative for sore throat and trouble swallowing.    Respiratory:  Negative for cough, chest tightness and shortness of breath.         Using rescue inhaler about 3 times weekly, currently on Symbicort once daily, off Singulair.  Feels overall he has been doing okay.   Cardiovascular:  Negative for chest pain, palpitations and leg swelling.   Gastrointestinal:  Negative for abdominal pain, blood in stool, nausea and vomiting.        Rare acid reflux   -gets every few months, no need for medication.  No change in bowel   Genitourinary:  Negative for dysuria and hematuria.   Musculoskeletal:  Positive for neck pain and neck stiffness.        Recently with some right sided neck stiffness, he feels he overdid it when he restarted with swimming, swim once.  No radicular complaints.  No headaches.   Neurological:  Negative for dizziness, syncope, light-headedness and headaches.   Hematological:  Does not bruise/bleed easily.   Psychiatric/Behavioral:  Negative for behavioral problems, confusion and sleep disturbance.        Current Outpatient Medications on File Prior to Visit   Medication Sig Dispense Refill    albuterol (Ventolin HFA) 90 mcg/act inhaler Inhale 1-2 puffs every 4 (four) hours as needed for wheezing 18 g 5     "budesonide-formoterol (Symbicort) 160-4.5 mcg/act inhaler Inhale 2 puffs 2 (two) times a day (rinse after use) (Patient taking differently: Inhale 2 puffs daily (rinse after use)) 30.6 g 3    montelukast (SINGULAIR) 10 mg tablet Take 1 tablet (10 mg total) by mouth daily (Patient taking differently: Take 10 mg by mouth daily ( Restarts if respiratory symptoms worsens )) 90 tablet 1     No current facility-administered medications on file prior to visit.         Objective   /80 (BP Location: Left arm, Patient Position: Sitting, Cuff Size: Standard)   Pulse 78   Resp 12   Ht 5' 11\" (1.803 m)   Wt 98 kg (216 lb)   SpO2 98%   BMI 30.13 kg/m²      Physical Exam  Constitutional:       General: He is not in acute distress.     Appearance: Normal appearance. He is well-developed. He is not ill-appearing.   HENT:      Right Ear: Tympanic membrane normal.      Left Ear: Tympanic membrane normal.      Mouth/Throat:      Pharynx: Oropharynx is clear.   Eyes:      General: No scleral icterus.  Neck:      Thyroid: No thyromegaly.      Vascular: No carotid bruit.   Cardiovascular:      Rate and Rhythm: Normal rate and regular rhythm.      Heart sounds: Normal heart sounds. No murmur heard.     Comments: No carotid bruit  Pulmonary:      Effort: Pulmonary effort is normal. No respiratory distress.      Breath sounds: Normal breath sounds. No wheezing, rhonchi or rales.   Abdominal:      General: There is no distension.      Palpations: Abdomen is soft.      Tenderness: There is no abdominal tenderness.   Musculoskeletal:      Right lower leg: No edema.      Left lower leg: No edema.      Comments: Sl tender right C-D w mild dec ROM    No weakness arms   Lymphadenopathy:      Cervical: No cervical adenopathy.   Skin:     Coloration: Skin is not jaundiced.   Neurological:      General: No focal deficit present.      Mental Status: He is alert.   Psychiatric:         Behavior: Behavior normal.         "

## 2025-01-27 NOTE — PATIENT INSTRUCTIONS
We did review previous blood work, CBC/CMP at ER 9/2024 were fine  He is up to date with Diabetes screening.  Glucose was ok at 96    Immunization History   Administered Date(s) Administered    COVID-19 PFIZER VACCINE 0.3 ML IM 03/15/2021, 04/05/2021    Influenza Quadrivalent, 6-35 Months IM 10/20/2016    Tdap 09/03/2019       He does not do yearly Flu shot.  I would like him to start doing 1 every fall season  Tdap/tetanus shot is up to date  (done every 10 yrs for superficial cuts, every 5 yrs for deep wounds)  Covid vaccine previously received x 2      Was never a smoker     Regarding Colon Cancer screening, we discussed Colonoscopy vs ColoGuard is indicated starting at age 45.    Not indicated.    Discussed Prostate Cancer screening pros/cons starting at age 50.  PSA blood test not indicated.    Continue to try to watch healthy diet, exercise routinely.    We will see him again in 12 months, sooner as needed.

## 2025-02-10 DIAGNOSIS — J45.30 MILD PERSISTENT ASTHMA WITHOUT COMPLICATION: ICD-10-CM

## 2025-02-11 RX ORDER — ALBUTEROL SULFATE 90 UG/1
1-2 INHALANT RESPIRATORY (INHALATION) EVERY 4 HOURS PRN
Qty: 18 G | Refills: 0 | Status: SHIPPED | OUTPATIENT
Start: 2025-02-11

## 2025-06-13 ENCOUNTER — OFFICE VISIT (OUTPATIENT)
Dept: FAMILY MEDICINE CLINIC | Facility: CLINIC | Age: 40
End: 2025-06-13
Payer: COMMERCIAL

## 2025-06-13 ENCOUNTER — APPOINTMENT (OUTPATIENT)
Dept: LAB | Facility: CLINIC | Age: 40
End: 2025-06-13
Payer: COMMERCIAL

## 2025-06-13 VITALS
HEIGHT: 71 IN | RESPIRATION RATE: 18 BRPM | BODY MASS INDEX: 29.68 KG/M2 | WEIGHT: 212 LBS | DIASTOLIC BLOOD PRESSURE: 74 MMHG | HEART RATE: 81 BPM | SYSTOLIC BLOOD PRESSURE: 128 MMHG | OXYGEN SATURATION: 97 % | TEMPERATURE: 98 F

## 2025-06-13 DIAGNOSIS — Z13.220 LIPID SCREENING: ICD-10-CM

## 2025-06-13 DIAGNOSIS — E66.3 OVERWEIGHT (BMI 25.0-29.9): ICD-10-CM

## 2025-06-13 DIAGNOSIS — J45.30 MILD PERSISTENT ASTHMA WITHOUT COMPLICATION: Primary | ICD-10-CM

## 2025-06-13 DIAGNOSIS — B35.3 TINEA PEDIS OF RIGHT FOOT: ICD-10-CM

## 2025-06-13 DIAGNOSIS — M62.08 DIASTASIS OF RECTUS ABDOMINIS: ICD-10-CM

## 2025-06-13 LAB
ALBUMIN SERPL BCG-MCNC: 4.4 G/DL (ref 3.5–5)
ALP SERPL-CCNC: 56 U/L (ref 34–104)
ALT SERPL W P-5'-P-CCNC: 41 U/L (ref 7–52)
ANION GAP SERPL CALCULATED.3IONS-SCNC: 10 MMOL/L (ref 4–13)
AST SERPL W P-5'-P-CCNC: 18 U/L (ref 13–39)
BILIRUB SERPL-MCNC: 0.51 MG/DL (ref 0.2–1)
BUN SERPL-MCNC: 15 MG/DL (ref 5–25)
CALCIUM SERPL-MCNC: 9.2 MG/DL (ref 8.4–10.2)
CHLORIDE SERPL-SCNC: 107 MMOL/L (ref 96–108)
CHOLEST SERPL-MCNC: 207 MG/DL (ref ?–200)
CO2 SERPL-SCNC: 24 MMOL/L (ref 21–32)
CREAT SERPL-MCNC: 1.07 MG/DL (ref 0.6–1.3)
ERYTHROCYTE [DISTWIDTH] IN BLOOD BY AUTOMATED COUNT: 12.3 % (ref 11.6–15.1)
GFR SERPL CREATININE-BSD FRML MDRD: 86 ML/MIN/1.73SQ M
GLUCOSE P FAST SERPL-MCNC: 84 MG/DL (ref 65–99)
HCT VFR BLD AUTO: 47.7 % (ref 36.5–49.3)
HDLC SERPL-MCNC: 34 MG/DL
HGB BLD-MCNC: 15.2 G/DL (ref 12–17)
LDLC SERPL CALC-MCNC: 141 MG/DL (ref 0–100)
MCH RBC QN AUTO: 27.6 PG (ref 26.8–34.3)
MCHC RBC AUTO-ENTMCNC: 31.9 G/DL (ref 31.4–37.4)
MCV RBC AUTO: 87 FL (ref 82–98)
NONHDLC SERPL-MCNC: 173 MG/DL
PLATELET # BLD AUTO: 262 THOUSANDS/UL (ref 149–390)
PMV BLD AUTO: 9.5 FL (ref 8.9–12.7)
POTASSIUM SERPL-SCNC: 4.2 MMOL/L (ref 3.5–5.3)
PROT SERPL-MCNC: 7.5 G/DL (ref 6.4–8.4)
RBC # BLD AUTO: 5.5 MILLION/UL (ref 3.88–5.62)
SODIUM SERPL-SCNC: 141 MMOL/L (ref 135–147)
TRIGL SERPL-MCNC: 159 MG/DL (ref ?–150)
TSH SERPL DL<=0.05 MIU/L-ACNC: 2.73 UIU/ML (ref 0.45–4.5)
WBC # BLD AUTO: 5.96 THOUSAND/UL (ref 4.31–10.16)

## 2025-06-13 PROCEDURE — 85027 COMPLETE CBC AUTOMATED: CPT

## 2025-06-13 PROCEDURE — 80053 COMPREHEN METABOLIC PANEL: CPT

## 2025-06-13 PROCEDURE — 36415 COLL VENOUS BLD VENIPUNCTURE: CPT

## 2025-06-13 PROCEDURE — 84443 ASSAY THYROID STIM HORMONE: CPT

## 2025-06-13 PROCEDURE — 99214 OFFICE O/P EST MOD 30 MIN: CPT | Performed by: NURSE PRACTITIONER

## 2025-06-13 NOTE — ASSESSMENT & PLAN NOTE
Symbicort maintenance inhaler.   Albuterol as needed, not using often.   Asthma tends to flare with poor air quality outdoors.   Montelukast as needed.

## 2025-06-13 NOTE — PROGRESS NOTES
Name: Jay Helm      : 1985      MRN: [id number]  Encounter Provider: HOMA Marie  Encounter Date: 2025   Encounter department: [facility]  :  Assessment & Plan  Mild persistent asthma without complication  Symbicort maintenance inhaler.   Albuterol as needed, not using often.   Asthma tends to flare with poor air quality outdoors.   Montelukast as needed.          Overweight (BMI 25.0-29.9)  Working on healthy eating, and regular exercise.   Blood work as noted.     Orders:  •  CBC; Future  •  Comprehensive metabolic panel; Future  •  TSH, 3rd generation; Future    Diastasis of rectus abdominis  Explained to patient what this is, and reassured it is not harmful.   If he would like to proceed with PT to improve this, he will let me know.        Tinea pedis of right foot  Right foot tinea pedis between 4th and 5th toes.   Will continue to use OTC product he has for this, as it has resolved itching and soreness.   Continue this medication for 3 weeks total, of not stop early.   Call me if symptoms should recur.        Lipid screening    Orders:  •  Lipid panel; Future          Depression Screening and Follow-up Plan: Patient was screened for depression during today's encounter. They screened negative with a PHQ-2 score of 0.        History of Present Illness   Jay Helm is a 39 year old male presenting today to establish care.   Prior patient of Dr. Patel, transferring care to our office due to proximity to his home.     Trying to eat healthy diet.   Exercise--swimming.   Just started swimming, only one day per week, but working on his endurance and would like to increase frequency.   Trying to work on weight loss.     Athletes foot right foot.  Started cream for athletes feet, OTC product.  It was sore, and itching, but feels better now.  Using the cream for 1.5 weeks now.    Abdomen lump noted when lying down and crunching stomach.   Used to lift weights years ago.  "  Now just does swimming.   Would like to run, asthma is limiting at time with this.     Reviewed past medical, surgical, family, and social history.     , no children.   Has 4 nephews that they enjoy spending time with.         Review of Systems   Constitutional: Negative.    HENT: Negative.     Respiratory: Negative.     Cardiovascular: Negative.    Gastrointestinal: Negative.  Negative for abdominal pain.        Abdomen as noted in HPI   Genitourinary: Negative.    Musculoskeletal: Negative.    Skin:  Positive for rash. Wound: athletes foot right foot.  Neurological: Negative.    Hematological: Negative.    Psychiatric/Behavioral: Negative.         Objective   /74 (BP Location: Left arm, Patient Position: Sitting, Cuff Size: Standard)   Pulse 81   Temp 98 °F (36.7 °C) (Temporal)   Resp 18   Ht 5' 11\" (1.803 m)   Wt 96.2 kg (212 lb)   SpO2 97%   BMI 29.57 kg/m²      Physical Exam  Vitals and nursing note reviewed.   Constitutional:       General: He is not in acute distress.     Appearance: Normal appearance. He is not ill-appearing.     Cardiovascular:      Rate and Rhythm: Normal rate and regular rhythm.      Heart sounds: No murmur heard.  Pulmonary:      Effort: Pulmonary effort is normal.      Breath sounds: Normal breath sounds.   Abdominal:      General: Abdomen is flat. Bowel sounds are normal.      Palpations: Abdomen is soft.      Tenderness: There is no abdominal tenderness.      Comments: Diastasis recti     Musculoskeletal:      Right lower leg: No edema.      Left lower leg: No edema.     Skin:     Comments: Right foot webbing between 4th and 5th toes with dry scaling skin, consistent with tinea pedis.   No evidence of tinea pedis anywhere else on the right foot or on the left foot.      Neurological:      Mental Status: He is alert.     Psychiatric:         Mood and Affect: Mood normal.       Current Medications[1]          [1]    Current Outpatient Medications:   •  albuterol " (Ventolin HFA) 90 mcg/act inhaler, Inhale 1-2 puffs every 4 (four) hours as needed for wheezing, Disp: 18 g, Rfl: 0  •  budesonide-formoterol (Symbicort) 160-4.5 mcg/act inhaler, Inhale 2 puffs 2 (two) times a day (rinse after use), Disp: 30.6 g, Rfl: 3  •  montelukast (SINGULAIR) 10 mg tablet, Take 1 tablet (10 mg total) by mouth daily, Disp: 90 tablet, Rfl: 1

## 2025-06-15 ENCOUNTER — RESULTS FOLLOW-UP (OUTPATIENT)
Dept: FAMILY MEDICINE CLINIC | Facility: CLINIC | Age: 40
End: 2025-06-15

## 2025-06-16 NOTE — TELEPHONE ENCOUNTER
----- Message from HOMA Viveros sent at 6/15/2025 10:57 AM EDT -----  Blood work is good, except cholesterol is high.   Continue healthy eating, regular exercise and working on weight loss.   ----- Message -----  From: Lab, Background User  Sent: 6/13/2025  12:06 PM EDT  To: HOMA Marie